# Patient Record
(demographics unavailable — no encounter records)

---

## 2025-01-27 NOTE — HISTORY OF PRESENT ILLNESS
[de-identified] : 74-year-old male with history of right shoulder arthroscopic RTCR and SAD with Dr Cash (1/10/22) here today for right shoulder and neck pain x 2 weeks. Denies trauma or injury. He complains of constant aching pain along the right side of the neck radiating down to the shoulder worse with sitting for a prolonged period of time and rotating his neck. He denies numbness or tingling. He has been taking Ibuprofen with some relief. He does have a history of C4-C6 laminectomy and fusion about 17 years ago.

## 2025-01-27 NOTE — DISCUSSION/SUMMARY
[de-identified] : The patient has sustained a sprain to the cervical spine.  This is worse with certain activities and is better taking ibuprofen.  Treatment options were discussed.  He will take ibuprofen as needed.  He will apply heat topically.  If not improved in 3 weeks he will be reevaluated.

## 2025-01-27 NOTE — PHYSICAL EXAM
[Rad] : radial 2+ and symmetric bilaterally [Normal] : Alert and in no acute distress [Poor Appearance] : well-appearing [Acute Distress] : not in acute distress [Obese] : not obese [de-identified] : The patient has no respiratory distress. Mood and affect are normal. The patient is alert and oriented to person, place and time. Examination of the cervical spine demonstrates no deformity. There is tenderness of the right paracervical muscles and the right trapezius muscle. There is mild muscle spasm. Cervical spine range of motion is right lateral rotation of 40, left lateral rotation of 40, extension of 45 and flexion of 45. Upper extremity neurologic exam is intact with regard to sensation. Motor function is 5 over 5 in all groups in the upper extremities. Deep tendon reflexes are 2+ and equal at the biceps, triceps and brachial radialis. Examination of the shoulders demonstrates no deformity.  There are scars from prior right shoulder surgery.  There is no right shoulder tenderness.  There is no instability.  Drop arm test is negative.  Impingement is mildly positive on the right.  Belly press test is negative.  Rotator cuff strength is excellent.  He exhibits shoulder elevation of 145 degrees bilaterally.  He lacks 2 levels of internal rotation on the right which has been a chronic problem.  There is no elbow tenderness.  Elbow motion is pain-free.  The skin is intact.  There is no lymphedema. [de-identified] : AP and lateral x-rays of the cervical spine demonstrate no acute fracture or dislocation.  He has had prior surgery which appears to be fusion of C5, 6, 7 with anterior fixation AP, transscapular and axillary x-rays of the right shoulder demonstrate no fracture or dislocation.  There are changes at the greater tuberosity consistent with a history of prior rotator cuff surgery.

## 2025-03-20 NOTE — ASSESSMENT
[FreeTextEntry1] : Alert, oriented, well pleasant.   Sun-exposed cutaneous exam. No evidence of cutaneous malignancy. Well healed scars. No evidence of recurrence. No lymphadenopathy.  Brown, yellow papules and plaques generalized.  Seborrheic keratoses.  No treatment.  Actinic damage.  Reviewed sun protection.  Compliant.  Follow up 1 year.